# Patient Record
Sex: MALE | Race: WHITE | Employment: FULL TIME | URBAN - METROPOLITAN AREA
[De-identification: names, ages, dates, MRNs, and addresses within clinical notes are randomized per-mention and may not be internally consistent; named-entity substitution may affect disease eponyms.]

---

## 2018-06-26 PROBLEM — Z80.42 FAMILY HISTORY OF PROSTATE CANCER: Status: ACTIVE | Noted: 2018-06-26

## 2018-12-23 ENCOUNTER — HOSPITAL ENCOUNTER (EMERGENCY)
Facility: HOSPITAL | Age: 53
Discharge: HOME | End: 2018-12-23
Attending: EMERGENCY MEDICINE
Payer: COMMERCIAL

## 2018-12-23 VITALS
HEIGHT: 66 IN | RESPIRATION RATE: 18 BRPM | OXYGEN SATURATION: 98 % | WEIGHT: 180 LBS | TEMPERATURE: 97 F | SYSTOLIC BLOOD PRESSURE: 157 MMHG | BODY MASS INDEX: 28.93 KG/M2 | DIASTOLIC BLOOD PRESSURE: 91 MMHG | HEART RATE: 64 BPM

## 2018-12-23 DIAGNOSIS — M10.9 GOUTY ARTHRITIS: Primary | ICD-10-CM

## 2018-12-23 PROCEDURE — 99283 EMERGENCY DEPT VISIT LOW MDM: CPT

## 2018-12-23 RX ORDER — METHYLPREDNISOLONE 4 MG/1
TABLET ORAL
Qty: 1 PACKAGE | Refills: 0 | Status: SHIPPED | OUTPATIENT
Start: 2018-12-23 | End: 2018-12-28

## 2018-12-23 RX ORDER — HYDROCODONE BITARTRATE AND ACETAMINOPHEN 5; 325 MG/1; MG/1
1-2 TABLET ORAL EVERY 4 HOURS PRN
Qty: 8 TABLET | Refills: 0 | Status: SHIPPED | OUTPATIENT
Start: 2018-12-23 | End: 2019-01-29

## 2018-12-23 NOTE — ED PROVIDER NOTES
Patient Seen in: [facility] Emergency Department    History   Patient presents with: Foot Pain    Stated Complaint: LFT. FOOT PAIN    HPI    Patient presents with left MTP toe pain. Patient says is similar previous episode of gout.   Present over t arthritis of the left big toe. He has had it before. Will treat with Medrol Dosepak, pain medications. Follow with primary care. Consider preventive medicine if significant number of recurrences.       MDM   Gouty arthritis            Disposition and Pl

## 2021-03-10 PROCEDURE — 88305 TISSUE EXAM BY PATHOLOGIST: CPT | Performed by: INTERNAL MEDICINE

## 2022-03-30 ENCOUNTER — LAB ENCOUNTER (OUTPATIENT)
Dept: LAB | Age: 57
End: 2022-03-30
Attending: FAMILY MEDICINE
Payer: COMMERCIAL

## 2022-03-30 DIAGNOSIS — Z12.5 SCREENING FOR MALIGNANT NEOPLASM OF PROSTATE: ICD-10-CM

## 2022-03-30 DIAGNOSIS — Z72.89 OTHER PROBLEMS RELATED TO LIFESTYLE: ICD-10-CM

## 2022-03-30 DIAGNOSIS — Z13.220 SCREENING FOR LIPID DISORDERS: ICD-10-CM

## 2022-03-30 DIAGNOSIS — Z13.1 SCREENING FOR DIABETES MELLITUS: ICD-10-CM

## 2022-03-30 DIAGNOSIS — Z11.59 NEED FOR HEPATITIS C SCREENING TEST: ICD-10-CM

## 2022-03-30 LAB
CHOLEST SERPL-MCNC: 208 MG/DL (ref ?–200)
FASTING PATIENT GLUCOSE ANSWER: YES
FASTING PATIENT LIPID ANSWER: YES
GLUCOSE BLD-MCNC: 93 MG/DL (ref 70–99)
HCV AB SERPL QL IA: NONREACTIVE
HDLC SERPL-MCNC: 31 MG/DL (ref 40–59)
LDLC SERPL CALC-MCNC: 137 MG/DL (ref ?–100)
NONHDLC SERPL-MCNC: 177 MG/DL (ref ?–130)
PSA SERPL-MCNC: 1.4 NG/ML (ref ?–4)
TRIGL SERPL-MCNC: 221 MG/DL (ref 30–149)
VLDLC SERPL CALC-MCNC: 41 MG/DL (ref 0–30)

## 2022-03-30 PROCEDURE — 84153 ASSAY OF PSA TOTAL: CPT

## 2022-03-30 PROCEDURE — 36415 COLL VENOUS BLD VENIPUNCTURE: CPT

## 2022-03-30 PROCEDURE — 80061 LIPID PANEL: CPT

## 2022-03-30 PROCEDURE — 82947 ASSAY GLUCOSE BLOOD QUANT: CPT

## 2022-03-30 PROCEDURE — 86803 HEPATITIS C AB TEST: CPT

## 2024-01-28 ENCOUNTER — HOSPITAL ENCOUNTER (EMERGENCY)
Facility: HOSPITAL | Age: 59
Discharge: HOME | End: 2024-01-28
Attending: EMERGENCY MEDICINE
Payer: COMMERCIAL

## 2024-01-28 ENCOUNTER — APPOINTMENT (OUTPATIENT)
Dept: GENERAL RADIOLOGY | Facility: HOSPITAL | Age: 59
End: 2024-01-28
Attending: EMERGENCY MEDICINE
Payer: COMMERCIAL

## 2024-01-28 VITALS
OXYGEN SATURATION: 96 % | HEART RATE: 74 BPM | BODY MASS INDEX: 30.53 KG/M2 | RESPIRATION RATE: 18 BRPM | SYSTOLIC BLOOD PRESSURE: 188 MMHG | DIASTOLIC BLOOD PRESSURE: 76 MMHG | TEMPERATURE: 98 F | WEIGHT: 190 LBS | HEIGHT: 66 IN

## 2024-01-28 DIAGNOSIS — M70.41 PREPATELLAR BURSITIS OF RIGHT KNEE: Primary | ICD-10-CM

## 2024-01-28 PROCEDURE — 73560 X-RAY EXAM OF KNEE 1 OR 2: CPT | Performed by: EMERGENCY MEDICINE

## 2024-01-28 PROCEDURE — 99284 EMERGENCY DEPT VISIT MOD MDM: CPT

## 2024-01-28 PROCEDURE — 99283 EMERGENCY DEPT VISIT LOW MDM: CPT

## 2024-01-28 RX ORDER — CEFADROXIL 500 MG/1
500 CAPSULE ORAL 2 TIMES DAILY
Qty: 14 CAPSULE | Refills: 0 | Status: SHIPPED | OUTPATIENT
Start: 2024-01-28 | End: 2024-02-04

## 2024-01-28 RX ORDER — IBUPROFEN 200 MG
200 TABLET ORAL EVERY 6 HOURS PRN
COMMUNITY

## 2024-01-28 NOTE — ED INITIAL ASSESSMENT (HPI)
Pt presents to ER with right knee pain. Pt states swelling started 4 days prior. Pt taking ibuprofen for pain. No fever. Pt is speaking clearly. Skin warm and dry. Respirations equal and nonlabored. Pt is a&ox3.

## 2024-01-28 NOTE — DISCHARGE INSTRUCTIONS
Return to the ER for new or worsening symptoms such as increased swelling, redness or fever    You can take acetaminophen 500 mg and ibuprofen 200 mg together every 4-6 hours for pain as needed.

## 2024-01-28 NOTE — ED PROVIDER NOTES
Patient Seen in: [facility] Emergency Department      History     Chief Complaint   Patient presents with    Knee Pain     Stated Complaint: ambulatory right knee pain x4 days    Subjective:   HPI    58-year-old with a history of high cholesterol presents for evaluation of right knee pain.  Started spontaneously 4 days ago.  Pain is more in the anterior aspect of the knee.  He noticed a bit of redness to the area as well.  Has become progressively more swollen.  Is still able to walk on it and move it.  No injury.  No recent kneeling.  No fever.  No other joints are bothering him.  Has never had this before.  Not aware of being diabetic.  Records reviewed.  Hemoglobin A1c was 5.6 in 2018.  Serum glucose was 93 in March 2022.  Objective:   Past Medical History:   Diagnosis Date    HYPERLIPIDEMIA               Past Surgical History:   Procedure Laterality Date    COLONOSCOPY N/A 3/10/2021    Procedure: COLONOSCOPY, POSSIBLE BIOPSY, POSSIBLE POLYPECTOMY 66514;  Surgeon: Thony Chaney MD;  Location: [facility]    COLONOSCOPY & POLYPECTOMY  4/21/15    Two small polyps removed; [facility]    COLONOSCOPY,BIOPSY N/A 4/21/2015    Procedure: COLONOSCOPY, POSSIBLE BIOPSY, POSSIBLE POLYPECTOMY 08286;  Surgeon: Alex Moody MD;  [place] [facility]                Social History     Socioeconomic History    Marital status:    Tobacco Use    Smoking status: Never    Smokeless tobacco: Never   Vaping Use    Vaping Use: Never used   Substance and Sexual Activity    Alcohol use: Yes     Comment: 1 per month    Drug use: No              Review of Systems    Positive for stated complaint: ambulatory right knee pain x4 days  Other systems are as noted in HPI.  Constitutional and vital signs reviewed.      All other systems reviewed and negative except as noted above.    Physical Exam     ED Triage Vitals [01/28/24 1125]   BP (!) 188/76   Pulse 74   Resp 18   Temp 97.9 °F (36.6 °C)   Temp src Temporal   SpO2  96 %   O2 Device None (Room air)       Current:BP (!) 188/76   Pulse 74   Temp 97.9 °F (36.6 °C) (Temporal)   Resp 18   Ht 167.6 cm (5' 6\")   Wt 86.2 kg   SpO2 96%   BMI 30.67 kg/m²         Physical Exam    General: Patient is awake alert no acute distress.  He is standing next to the cart when I enter the room.  Right lower extremity: The prepatellar bursa is mildly swollen and tender.  It is mildly erythematous with minimal warmth.  No purulence or pointing.  There is some pain with active range of motion of the right knee but no knee joint irritability.  No knee joint instability.    ED Course   Labs Reviewed - No data to display  Right knee: I personally reviewed the radiographs and my individual interpretation shows no fracture.  I also reviewed the official report which showed findings suggestive of prepatellar/pretibial bursitis with increased soft tissue density and swelling to that region.  No significant joint effusion.        Ace wrap ordered         MDM          Previous records reviewed as noted in HPI    Differential includes, but is not limited to, septic arthritis, prepatellar bursitis, tibial plateau fracture     Review of any radiographic studies: X-ray with increased soft tissue density in the prepatellar region which is consistent with prepatellar bursitis.  No significant joint effusion.    Shared decision making with the patient.  58-year-old with mild prepatellar bursitis of the right knee.  Swelling is relatively mild, aspiration/drainage not indicated.  Will start antibiotics and advised patient to follow-up with his primary care physician as an outpatient.  He may benefit from hemoglobin A1c testing, although I will defer to his primary care physician on that.    Recommend OTC meds for discomfort as needed                                                        Medical Decision Making      Disposition and Plan     Clinical Impression:  1. Prepatellar bursitis of right knee          Disposition:  Discharge  1/28/2024 12:54 pm    Follow-up:  Nuno Echols MD  [address] [place] [address] [phone]    Schedule an appointment as soon as possible for a visit in 1 week(s)            Medications Prescribed:  Current Discharge Medication List        START taking these medications    Details   cefadroxil 500 MG Oral Cap Take 1 capsule (500 mg total) by mouth 2 (two) times daily for 7 days.  Qty: 14 capsule, Refills: 0

## 2024-02-03 ENCOUNTER — HOSPITAL ENCOUNTER (EMERGENCY)
Facility: HOSPITAL | Age: 59
Discharge: HOME | End: 2024-02-03
Attending: EMERGENCY MEDICINE
Payer: COMMERCIAL

## 2024-02-03 VITALS
HEIGHT: 66 IN | WEIGHT: 190 LBS | BODY MASS INDEX: 30.53 KG/M2 | TEMPERATURE: 97 F | OXYGEN SATURATION: 98 % | DIASTOLIC BLOOD PRESSURE: 98 MMHG | SYSTOLIC BLOOD PRESSURE: 159 MMHG | HEART RATE: 73 BPM | RESPIRATION RATE: 18 BRPM

## 2024-02-03 DIAGNOSIS — L02.91 ABSCESS: Primary | ICD-10-CM

## 2024-02-03 PROCEDURE — 10061 I&D ABSCESS COMP/MULTIPLE: CPT

## 2024-02-03 PROCEDURE — 99284 EMERGENCY DEPT VISIT MOD MDM: CPT

## 2024-02-03 PROCEDURE — 99283 EMERGENCY DEPT VISIT LOW MDM: CPT

## 2024-02-03 RX ORDER — LIDOCAINE HYDROCHLORIDE AND EPINEPHRINE 10; 10 MG/ML; UG/ML
INJECTION, SOLUTION INFILTRATION; PERINEURAL
Status: COMPLETED
Start: 2024-02-03 | End: 2024-02-03

## 2024-02-03 RX ORDER — LIDOCAINE HYDROCHLORIDE AND EPINEPHRINE 10; 10 MG/ML; UG/ML
20 INJECTION, SOLUTION INFILTRATION; PERINEURAL ONCE
Status: COMPLETED | OUTPATIENT
Start: 2024-02-03 | End: 2024-02-03

## 2024-02-03 RX ORDER — SULFAMETHOXAZOLE AND TRIMETHOPRIM 800; 160 MG/1; MG/1
1 TABLET ORAL 2 TIMES DAILY
Qty: 10 TABLET | Refills: 0 | Status: SHIPPED | OUTPATIENT
Start: 2024-02-03 | End: 2024-02-08

## 2024-02-03 NOTE — ED INITIAL ASSESSMENT (HPI)
Patient here with c/o possible abscess on his back.  Patient reports he noticed a small lump a few days ago and it has since grown over night.  Denies fever.  Patient currently on abx for bursitis in his knee.

## 2024-02-03 NOTE — ED PROVIDER NOTES
Patient Seen in: [facility] Emergency Department      History     Chief Complaint   Patient presents with    Abscess     Stated Complaint: pt states lump on back that hs doubled overnight    Subjective:   HPI    This is a 58-year-old male who had a abscess possible in his left side of his back.  He states that he had a small lump for some time.  And then over the last several days it grew.  He has had some pain at that.  He did state that he was been placed on some antibiotics for for bursitis.  He has had no fevers or chills.  He denies any chest pain abdominal pain chills or fevers.  Denies any trauma.      Objective:   Past Medical History:   Diagnosis Date    HYPERLIPIDEMIA               Past Surgical History:   Procedure Laterality Date    COLONOSCOPY N/A 3/10/2021    Procedure: COLONOSCOPY, POSSIBLE BIOPSY, POSSIBLE POLYPECTOMY 41912;  Surgeon: Thony Chaney MD;  Location: [facility]    COLONOSCOPY & POLYPECTOMY  4/21/15    Two small polyps removed; [facility]    COLONOSCOPY,BIOPSY N/A 4/21/2015    Procedure: COLONOSCOPY, POSSIBLE BIOPSY, POSSIBLE POLYPECTOMY 70566;  Surgeon: Alex Moody MD;  [place] [facility] [place] [facility]                Social History     Socioeconomic History    Marital status:    Tobacco Use    Smoking status: Never    Smokeless tobacco: Never   Vaping Use    Vaping Use: Never used   Substance and Sexual Activity    Alcohol use: Yes     Comment: 1 per month    Drug use: No              Review of Systems    Positive for stated complaint: pt states lump on back that hs doubled overnight  Other systems are as noted in HPI.  Constitutional and vital signs reviewed.      All other systems reviewed and negative except as noted above.    Physical Exam     ED Triage Vitals [02/03/24 0434]   BP (!) 159/98   Pulse 73   Resp 18   Temp 96.8 °F (36 °C)   Temp src Temporal   SpO2 98 %   O2 Device None (Room air)       Current:BP (!) 159/98   Pulse 73   Temp 96.8 °F (36 °C) (Temporal)    Resp 18   Ht 167.6 cm (5' 6\")   Wt 86.2 kg   SpO2 98%   BMI 30.67 kg/m²         Physical Exam  General: Patient is in no respiratory distress  The patient is in no respiratory distress    HEENT: There is no signs of trauma.  Oral mucosa is wet.    Lungs: Clear to auscultation without wheezing or retractions  On his left posterior thoracic area lateral to midline.  There is approximate 2 x 3 cm elevation of the skin without any cellulitis it is slightly tender subcutaneous.  Not fluctuant or crepitant.  Cardiovascular: Regular without murmurs    Extremities: Good pulses bilaterally.      Neuro: Alert and oriented.  The patient is moving all extremities there is no focal findings.       ED Course   Labs Reviewed - No data to display          The abscess was anesthetized with lidocaine with epinephrine. A oblique incision was performed with an 11 blade.  Pus was expressed and loculations were freed.  A wick was applied..  I discussed with him that needs to be removed in 3 to 4 days.  He can go to the urgent care or return to the emergency room or follow-up with his primary care physician.  Also with him the things to return here including fevers or chills it does seem to be subcutaneous possible lipoma that might get infected or possible abscess that is progressive got worse.  He does not look ill or septic.  Clinically looks well-hydrated.  I discussed with him that if things get worse.  He is to return to the emergency room.             MDM   The patient is taking Keflex for bursitis.  I discussed he can discontinue the Keflex but if there is any redness or discomfort that is progressive in nature then start Bactrim.  Otherwise I recommend close follow-up with his primary MD.  He does have an appointment to see a dermatologist I discussed with them I be fine but if they have any issues he can follow-up also with general surgery.                                   Medical Decision Making      Disposition and Plan      Clinical Impression:  1. Abscess         Disposition:  Discharge  2/3/2024  5:37 am    Follow-up:  Nuno Echols MD  [address] [place] [address] [phone]    Follow up in 2 day(s)      Fam Her MD  [address]  [facility] [address]  [phone]    Follow up in 3 day(s)            Medications Prescribed:  Current Discharge Medication List        START taking these medications    Details   sulfamethoxazole-trimethoprim -160 MG Oral Tab per tablet Take 1 tablet by mouth 2 (two) times daily for 5 days.  Qty: 10 tablet, Refills: 0

## 2024-02-03 NOTE — DISCHARGE INSTRUCTIONS
Follow-up with your primary care physician for general surgery or dermatology referral.  Follow-up with your primary care physician or urgent care for wound check and possible wick removal in 3 to 4 days.  If you have increasing pain discomfort or fever you need to return here.

## 2024-02-11 ENCOUNTER — HOSPITAL ENCOUNTER (EMERGENCY)
Facility: HOSPITAL | Age: 59
Discharge: HOME | End: 2024-02-11
Attending: EMERGENCY MEDICINE
Payer: COMMERCIAL

## 2024-02-11 VITALS
SYSTOLIC BLOOD PRESSURE: 160 MMHG | OXYGEN SATURATION: 99 % | TEMPERATURE: 98 F | BODY MASS INDEX: 31 KG/M2 | RESPIRATION RATE: 16 BRPM | HEART RATE: 72 BPM | WEIGHT: 189.63 LBS | DIASTOLIC BLOOD PRESSURE: 95 MMHG

## 2024-02-11 DIAGNOSIS — M79.675 PAIN IN TOES OF BOTH FEET: Primary | ICD-10-CM

## 2024-02-11 DIAGNOSIS — M79.674 PAIN IN TOES OF BOTH FEET: Primary | ICD-10-CM

## 2024-02-11 PROCEDURE — 99284 EMERGENCY DEPT VISIT MOD MDM: CPT

## 2024-02-11 PROCEDURE — 99283 EMERGENCY DEPT VISIT LOW MDM: CPT

## 2024-02-11 RX ORDER — INDOMETHACIN 25 MG/1
25 CAPSULE ORAL
Status: DISCONTINUED | OUTPATIENT
Start: 2024-02-11 | End: 2024-02-11

## 2024-02-11 RX ORDER — INDOMETHACIN 25 MG/1
CAPSULE ORAL 3 TIMES DAILY PRN
Qty: 20 CAPSULE | Refills: 0 | Status: SHIPPED | OUTPATIENT
Start: 2024-02-11

## 2024-02-11 NOTE — ED PROVIDER NOTES
Patient Seen in: [facility] Emergency Department      History     Chief Complaint   Patient presents with    Leg or Foot Injury     Stated Complaint: \"I need to be admitted for my feet\" Will not explain further other than they hu*    Subjective:   HPI    Patient presents to the emergency department with bilateral great toe pain.  There is no history of trauma, but he does state that he was recently crawling around in a crawl space in an uncomfortable, cramped position.  Patient also has a previous history of gout, but has never experienced gout in both great toes at the same time.  He has no other areas of discomfort.    Objective:   Past Medical History:   Diagnosis Date    HYPERLIPIDEMIA               Past Surgical History:   Procedure Laterality Date    COLONOSCOPY N/A 3/10/2021    Procedure: COLONOSCOPY, POSSIBLE BIOPSY, POSSIBLE POLYPECTOMY 73576;  Surgeon: Thony Chaney MD;  Location: [facility]    COLONOSCOPY & POLYPECTOMY  4/21/15    Two small polyps removed; [facility]    COLONOSCOPY,BIOPSY N/A 4/21/2015    Procedure: COLONOSCOPY, POSSIBLE BIOPSY, POSSIBLE POLYPECTOMY 00538;  Surgeon: Alex Moody MD;  Location: [facility] [place] [facility]                Social History     Socioeconomic History    Marital status:    Tobacco Use    Smoking status: Never    Smokeless tobacco: Never   Vaping Use    Vaping Use: Never used   Substance and Sexual Activity    Alcohol use: Yes     Comment: 1 per month    Drug use: No              Review of Systems    Positive for stated complaint: \"I need to be admitted for my feet\" Will not explain further other than they hu*  Other systems are as noted in HPI.  Constitutional and vital signs reviewed.      All other systems reviewed and negative except as noted above.    Physical Exam     ED Triage Vitals [02/11/24 0641]   BP (!) 160/95   Pulse 72   Resp 16   Temp 97.8 °F (36.6 °C)   Temp src Temporal   SpO2 99 %   O2 Device None (Room air)       Current:BP (!) 160/95    Pulse 72   Temp 97.8 °F (36.6 °C) (Temporal)   Resp 16   Wt 86 kg   SpO2 99%   BMI 30.60 kg/m²         Physical Exam  Vitals and nursing note reviewed.   Constitutional:       General: He is not in acute distress.     Appearance: He is well-developed. He is not ill-appearing.   Musculoskeletal:      Comments: Bilateral first MTP joint tenderness with slight swelling.  No evidence of trauma, ecchymosis or deformities noted.   Neurological:      Mental Status: He is alert and oriented to person, place, and time.              ED Course   Labs Reviewed - No data to display       Medications - No data to display    I discussed possible imaging studies with the patient and, with shared decision making, agreed that imaging would be of low yield.  There is no history of significant trauma other than possible overuse while crawling around in the crawl space.         MDM      Patient comes to the emergency department with bilateral great toe pain.  Patient has previous history of gout and this is likely the precipitating diagnosis.  Differentials for acute fracture, septic joint and cellulitis were considered, but patient's clinical appearance was not consistent with any of these.  Patient was given indomethacin in the emergency department.  He was observed for period of time as diclofenac is listed as an allergy.  He was able to tolerate the indomethacin without any difficulties.  He was given prescription for indomethacin and instructed follow-up closely with primary care physician.  He was instructed return immediately for any acute change or worsening of symptoms.                                   Medical Decision Making      Disposition and Plan     Clinical Impression:  1. Pain in toes of both feet         Disposition:  Discharge  2/11/2024  8:26 am    Follow-up:  Nuno Echols MD  [address] [place] [id number] [phone]    Call in 2 day(s)            Medications Prescribed:  Discharge Medication List as  of 2/11/2024  8:35 AM        START taking these medications    Details   indomethacin 25 MG Oral Cap Take 1-2 capsules (25-50 mg total) by mouth 3 (three) times daily as needed (pain)., Normal, Disp-20 capsule, R-0

## 2024-02-11 NOTE — ED INITIAL ASSESSMENT (HPI)
Pt arrives with c/o bilateral first toe pain. Pt states he believes he has arthritis and aggravated his toes after being in his crawl space yesterday.

## (undated) NOTE — ED AVS SNAPSHOT
Werner Adriannakeegan   MRN: [id number]    Department:  [facility] Emergency Department   Date of Visit:  12/23/2018           Disclosure     Insurance plans vary and the physician(s) referred by the ER may not be covered by your plan.  Please contact tell this physician (or your personal doctor if your instructions are to return to your personal doctor) about any new or lasting problems. The primary care or specialist physician will see patients referred from the [facility] Emergency Department.  Eron Griffin